# Patient Record
Sex: MALE | Race: WHITE | NOT HISPANIC OR LATINO | Employment: OTHER | URBAN - METROPOLITAN AREA
[De-identification: names, ages, dates, MRNs, and addresses within clinical notes are randomized per-mention and may not be internally consistent; named-entity substitution may affect disease eponyms.]

---

## 2017-01-20 NOTE — PATIENT DISCUSSION
(H25.043) Posterior subcapsular polar age-related cataract, bilateral - Assesment : Examination revealed Posterior Subcapsular Polar Senile Cataract. - Plan : Monitor for changes. Advised patient to call our office with decreased vision or increased symptoms.

## 2017-01-20 NOTE — PATIENT DISCUSSION
(O08.066) Vitreous degeneration, bilateral - Assesment : Examination revealed PVD/Dense vitreous strands OU. No defects 360 degrees OU. - Plan : Monitor for changes. Advised patient to call our office with decreased vision or an increase in flashes and/or floaters.

## 2017-01-20 NOTE — PATIENT DISCUSSION
(H35.362) Drusen (degenerative) of macula, left eye - Assesment : Examination revealed Drusen OS. (+) FHx of ARMD - mother. - Plan : Continue areds vitamins and amsler grid testing. Monitor for changes. Advised patient to call our office with decreased vision or increased symptoms.

## 2017-01-20 NOTE — PATIENT DISCUSSION
(H25.13) Age-related nuclear cataract, bilateral - Assesment : Examination reveals moderate senile nuclear cataract. Patient currently has mild symptoms but functioning well. - Plan : Monitor for changes at this time. May consider cataract surgery if vision is still bothersome after getting new rx glasses. Advised patient to call our office with decreased vision or increased symptoms.  RV 1 year Exam.

## 2017-01-20 NOTE — PATIENT DISCUSSION
(E11.9) Type 2 diabetes mellitus without complications - Assesment : Patient has type II diabetes without complications. - Plan : Discussed the condition and explained to patient that diabetes can cause diabetic retinopathy that can lead to irreversible vision loss. Will continue to observe. Instructed the patient to call if any changes in vision, increase in flashes, or increase in floaters.

## 2017-01-20 NOTE — PATIENT DISCUSSION
(H25.013) Cortical age-related cataract, bilateral - Assesment : Examination revealed Cortical Senile Cataract. - Plan : Monitor for changes. Advised patient to call our office with decreased vision or increased symptoms.

## 2018-02-09 NOTE — PATIENT DISCUSSION
(H25.13) Age-related nuclear cataract, bilateral - Assesment : Examination revealed cataract OU. Mild-moderate symptoms, visual function affected. - Plan : Monitor for changes. Advised patient to call our office with decreased vision or increased symptoms. Updated glasses Rx given, recommend wearing majority of time, especially driving.

## 2018-02-09 NOTE — PATIENT DISCUSSION
(J09.615) Dermatochalasis of left upper eyelid - Assesment : Examination revealed Dermatochalasis BUL. - Plan : Monitor for changes. Advised patient to call our office with decreased vision or increased symptoms.

## 2018-02-09 NOTE — PATIENT DISCUSSION
(W29.604) Dermatochalasis of right upper eyelid - Assesment : Examination revealed Dermatochalasis BUL. - Plan : Monitor for changes. Advised patient to call our office with decreased vision or increased symptoms.

## 2018-02-09 NOTE — PATIENT DISCUSSION
(E11.9) Type 2 diabetes mellitus without complications - Assesment : Examination reveals Type 2 Diabetes mellitus without ocular complications. - Plan : Continue following with PCP for routine care. Stressed importance of keeping A1c levels below 7.0 and monitoring blood sugar. Letter explaining today's findings faxed to patient's PCP. RV in August for Complete Exam and glare check. , or sooner if having problems or changes in vision.

## 2018-08-10 NOTE — PATIENT DISCUSSION
(E11.9) Type 2 diabetes mellitus without complications - Assesment : Examination reveals Type 2 Diabetes mellitus without ocular complications. - Plan : Continue following with PCP for routine care. Stressed importance of keeping A1c levels below 7.0 and monitoring blood sugar. Letter explaining today's findings faxed to patient's PCP. RTC 6 month follow up, or sooner if having problems or changes in vision.

## 2018-08-10 NOTE — PATIENT DISCUSSION
(H25.13) Age-related nuclear cataract, bilateral - Assesment : Examination reveals moderate senile nuclear cataract. Patient currently has mild symptoms but functioning well. - Plan : Monitor for changes at this time. May consider cataract surgery if vision is still bothersome after getting new rx glasses. Advised patient to call our office with decreased vision or increased symptoms.

## 2018-08-10 NOTE — PATIENT DISCUSSION
(Y42.232) Dermatochalasis of left upper eyelid - Assesment : Examination revealed Dermatochalasis BUL. - Plan : Monitor for changes. Advised patient to call our office with decreased vision or increased symptoms.

## 2018-08-10 NOTE — PATIENT DISCUSSION
(G26.474) Vitreous degeneration, left eye - Assesment : Examination revealed a posterior vitreous detachment. - Plan : Handouts given on posterior vitreous detachment and risk factors discussed for retinal detachment development. Advised to call immediately with any changes.

## 2018-08-10 NOTE — PATIENT DISCUSSION
(G06.293) Keratoconjunct sicca, not specified as Sjogren's, bilateral - Assesment : Examination revealed Dry Eye Syndrome OU. - Plan : Monitor for changes. Advised patient to call our office with decreased vision or increased symptoms.

## 2018-08-10 NOTE — PATIENT DISCUSSION
(Q91.648) Dermatochalasis of right upper eyelid - Assesment : Examination revealed Dermatochalasis BUL. - Plan : Monitor for changes. Advised patient to call our office with decreased vision or increased symptoms.

## 2019-02-08 NOTE — PATIENT DISCUSSION
(I12.931) Dermatochalasis of left upper eyelid - Assesment : Examination revealed Dermatochalasis - Plan : Monitor for changes. Advised patient to call our office with decreased vision or increased symptoms.

## 2019-02-08 NOTE — PATIENT DISCUSSION
(W68.087) Dermatochalasis of right upper eyelid - Assesment : Examination revealed Dermatochalasis - Plan : Monitor for changes. Advised patient to call our office with decreased vision or increased symptoms.

## 2019-02-08 NOTE — PATIENT DISCUSSION
(H25.013) Cortical age-related cataract, bilateral - Assesment : Examination revealed Cortical Senile Cataract. - Plan : Risks, Benefits and Alternatives were discussed with patient at length for Cataract Surgery. Visual symptoms are consistent with Cataract findings on examination and current refraction no longer provides satisfactory vision. Patient understands and desires surgery. All questions answered. Risks, Benefits and Alternatives discussed at length for IOL placement. Patient will need to wear glasses for TBD. RTC May ASCAN/dilation  EYE: OD IOL TYPE: TBD POST OPERATIVE TARGET:TBD DR RECOMMENDED PACKAGE:  TBD PT PREFERRED PACKAGE:  TBD OS to follow Patient will call to schedule appt to see surgery counselor.

## 2019-02-08 NOTE — PATIENT DISCUSSION
(E11.9) Type 2 diabetes mellitus without complications - Assesment : Examination reveals Type 2 Diabetes mellitus without ocular complications. Baseline OCT mac today suggests OD WNL, OS possible DR. - Plan : Continue following with PCP for routine care. Stressed importance of keeping A1c levels below 7.0 and monitoring blood sugar. Letter explaining today's findings faxed to patient's PCP.

## 2019-02-18 NOTE — PATIENT DISCUSSION
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 K clear Os. Hx of trauma to OS x 20 years ago. - Plan : Lash epilated at slit lamp with forceps, without incident. Advised patient to call our office with decreased vision or increased symptoms. Keep scheduled appt.

## 2019-02-18 NOTE — PATIENT DISCUSSION
(T15. 12XA) Foreign body in conjunctival sac, left eye, init encntr - Assesment : Examination revealed foreign body. Fiber in conjunctival sac OS. - Plan : Removed with qtip at slit lamp. Monitor for changes.

## 2019-04-22 NOTE — PATIENT DISCUSSION
(H25.875) Posterior subcapsular polar age-related cataract, bilateral - Assesment : Examination revealed Posterior Subcapsular Polar Senile Cataract. - Plan : see plan #1.

## 2019-04-22 NOTE — PATIENT DISCUSSION
(H25.013) Cortical age-related cataract, bilateral - Assesment : Examination revealed Cortical Senile Cataract. Patient is symptomatic with visual function affected. - Plan : Risks, Benefits and Alternatives were discussed with patient at length for Cataract Surgery. Visual symptoms are consistent with Cataract findings on examination and current refraction no longer provides satisfactory vision. Minimal astigmatism OU and does not warrant toric lens. Patient does not mind wearing glasses. Patient understands and desires surgery. All questions answered. Risks, Benefits and Alternatives discussed at length for IOL placement. Patient will need to wear glasses for near and best corrected distance vision. EYE: OD IOL TYPE: Monofocal lens  POST OPERATIVE TARGET: [place]  RECOMMENDED PACKAGE:  None  PT PREFERRED PACKAGE:  None  OS to follow. Patient to see surgery counselor today.

## 2019-05-10 NOTE — PATIENT DISCUSSION
(H25.012) Cortical age-related cataract, left eye - Assesment : Examination revealed Cortical Senile Cataract. Patient is symptomatic with visual function affected. - Plan : Risks, Benefits and Alternatives were discussed with patient at length for Cataract Surgery. Visual symptoms are consistent with Cataract findings on examination and current refraction no longer provides satisfactory vision. Minimal astigmatism OU and does not warrant toric lens. Patient does not mind wearing glasses. Patient understands and desires surgery. All questions answered. Risks, Benefits and Alternatives discussed at length for IOL placement. Patient will need to wear glasses for near and best corrected distance vision. EYE: OS IOL TYPE: Monofocal lens  POST OPERATIVE TARGET: [place]  RECOMMENDED PACKAGE:  None  PT PREFERRED PACKAGE:  None   Patient to see surgery counselor today.

## 2019-05-29 NOTE — PATIENT DISCUSSION
(Z96.1) Presence of intraocular lens - Assesment : Patient is Pseudophakic. - Plan : Discussed signs and symptoms of infection and retinal detachments. Do not rub operated eye. Follow drop schedule If redness,pain,decreased vision, flashes or floaters occur then contact clinic.   RTC 3 weeks post op follow up

## 2019-06-17 NOTE — PATIENT DISCUSSION
(R79.516) Dermatochalasis of right upper eyelid - Assesment : Examination revealed Dermatochalasis - Plan : May consider lid evaluation with JRB once patient recovers from back surgery. Patient will call to schedule. Monitor for changes. Advised patient to call our office with decreased vision or increased symptoms.

## 2019-06-17 NOTE — PATIENT DISCUSSION
(X04.988) Dermatochalasis of left upper eyelid - Assesment : Examination revealed Dermatochalasis - Plan : See plan #2. Monitor for changes. Advised patient to call our office with decreased vision or increased symptoms.

## 2019-08-16 NOTE — PATIENT DISCUSSION
(T08.924) Dermatochalasis of left upper eyelid - Assesment : Examination revealed Dermatochalasis - Plan : TYPE OF SURGERY: BULB          I counseled the patient regarding the following:Blepharoplasty: Bilateral upper blepharoplasty discussed with patient. Visual Fields and Photos were done today to demonstrate the visual impairment related to the visual complaint of the patient. R/B/A to surgery discussed with patient, including, but not limited to: scar, pain, bleeding, infection, loss of vision, double vision, asymmetry, over correction, under correction, poor cosmetic outcome, difficulty closing eyes, dry eye, dependence on lubricating drops postoperatively. Patient understands brow ptosis composes part of upper lid ptosis. Patient understands R/B/A and would like to proceed with surgery in the near future.

## 2019-08-16 NOTE — PATIENT DISCUSSION
(G82.982) Dermatochalasis of right upper eyelid - Assesment : Examination revealed Dermatochalasis. ADVISED PATIENT NOT TO SUBMERGE HEAD UNDER WATER AFTER SURGERY AND DO NOT RUB THE EYES.  EXPECT TO BE RED AND PUFFY AFTER THE SURGERY - Plan : TYPE OF SURGERY: BULB          I counseled the patient regarding the following:Blepharoplasty: Bilateral upper blepharoplasty discussed with patient. Visual Fields and Photos were done today to demonstrate the visual impairment related to the visual complaint of the patient. R/B/A to surgery discussed with patient, including, but not limited to: scar, pain, bleeding, infection, loss of vision, double vision, asymmetry, over correction, under correction, poor cosmetic outcome, difficulty closing eyes, dry eye, dependence on lubricating drops postoperatively. Patient understands brow ptosis composes part of upper lid ptosis. Patient understands R/B/A and would like to proceed with surgery in the near future.

## 2019-09-19 NOTE — PATIENT DISCUSSION
(U78.330) Dermatochalasis of left upper eyelid - Assesment : Status post operative BULB. - Plan : Discussed signs and symptoms of infection, bleeding, dry eyes. Continue Antibiotic Ointment and tears per the post operative instructions. Follow up in one week.

## 2019-09-19 NOTE — PATIENT DISCUSSION
(D30.874) Dermatochalasis of right upper eyelid - Assesment : Status post operative BULB. Avoid submerging head under water. - Plan : Discussed signs and symptoms of infection, bleeding, dry eyes. Continue Antibiotic Ointment and tears per the post operative instructions. Follow up in one week.

## 2019-09-27 NOTE — PATIENT DISCUSSION
(L71.778) Dermatochalasis of right upper eyelid - Assesment : Status post operative BULB. Sutures removed today - Plan : Discussed signs and symptoms of infection, bleeding, dry eyes. Continue Antibiotic Ointment QHS x 4 nights then D/C and tears per the post operative instructions. Stressed importance of not rubbing eyes, can resume wearing makeup in 1 week, sleep goggles given to be worn QHS.   RV Feb Exam/Mac OCT

## 2019-09-27 NOTE — PATIENT DISCUSSION
(D42.098) Dermatochalasis of left upper eyelid - Assesment : Status post operative BULB.  - Plan : See plan # 2

## 2020-12-03 ENCOUNTER — NEW PATIENT (OUTPATIENT)
Dept: URBAN - METROPOLITAN AREA CLINIC 44 | Facility: CLINIC | Age: 65
End: 2020-12-03

## 2020-12-03 DIAGNOSIS — H02.831: ICD-10-CM

## 2020-12-03 DIAGNOSIS — H02.835: ICD-10-CM

## 2020-12-03 DIAGNOSIS — H57.813: ICD-10-CM

## 2020-12-03 DIAGNOSIS — H02.834: ICD-10-CM

## 2020-12-03 DIAGNOSIS — H02.832: ICD-10-CM

## 2020-12-03 PROCEDURE — 99203 OFFICE O/P NEW LOW 30 MIN: CPT

## 2020-12-03 PROCEDURE — 92285 EXTERNAL OCULAR PHOTOGRAPHY: CPT

## 2020-12-03 ASSESSMENT — VISUAL ACUITY
OS_SC: 20/20
OD_SC: 20/20

## 2020-12-07 ENCOUNTER — TECH ONLY (OUTPATIENT)
Dept: URBAN - METROPOLITAN AREA CLINIC 43 | Facility: CLINIC | Age: 65
End: 2020-12-07

## 2020-12-07 DIAGNOSIS — H02.835: ICD-10-CM

## 2020-12-07 DIAGNOSIS — H02.834: ICD-10-CM

## 2020-12-07 DIAGNOSIS — H02.832: ICD-10-CM

## 2020-12-07 DIAGNOSIS — H02.831: ICD-10-CM

## 2020-12-07 DIAGNOSIS — H57.813: ICD-10-CM

## 2020-12-07 PROCEDURE — 92082 INTERMEDIATE VISUAL FIELD XM: CPT

## 2020-12-07 PROCEDURE — 99211T TECH SERVICE

## 2021-01-07 ENCOUNTER — PRE-OP/H&P (OUTPATIENT)
Dept: URBAN - METROPOLITAN AREA CLINIC 44 | Facility: CLINIC | Age: 66
End: 2021-01-07

## 2021-01-07 DIAGNOSIS — H02.835: ICD-10-CM

## 2021-01-07 DIAGNOSIS — H02.832: ICD-10-CM

## 2021-01-07 DIAGNOSIS — H02.834: ICD-10-CM

## 2021-01-07 DIAGNOSIS — H02.831: ICD-10-CM

## 2021-01-07 DIAGNOSIS — H57.813: ICD-10-CM

## 2021-01-07 PROCEDURE — 99211HP H&P OFFICE/OUTPATIENT VISIT, EST

## 2021-01-07 RX ORDER — TOBRAMYCIN AND DEXAMETHASONE 1; 3 MG/ML; MG/ML
1 SUSPENSION/ DROPS OPHTHALMIC
Start: 2021-01-25

## 2021-01-07 RX ORDER — ERYTHROMYCIN 5 MG/G
OINTMENT OPHTHALMIC
Start: 2021-01-25

## 2021-02-02 ENCOUNTER — PRE-OP/H&P (OUTPATIENT)
Dept: URBAN - METROPOLITAN AREA SURGERY 14 | Facility: SURGERY | Age: 66
End: 2021-02-02

## 2021-02-02 ENCOUNTER — SURGERY/PROCEDURE (OUTPATIENT)
Dept: URBAN - METROPOLITAN AREA SURGERY 14 | Facility: SURGERY | Age: 66
End: 2021-02-02

## 2021-02-02 DIAGNOSIS — H02.831: ICD-10-CM

## 2021-02-02 DIAGNOSIS — H02.835: ICD-10-CM

## 2021-02-02 DIAGNOSIS — H02.834: ICD-10-CM

## 2021-02-02 DIAGNOSIS — Z41.1: ICD-10-CM

## 2021-02-02 DIAGNOSIS — H57.813: ICD-10-CM

## 2021-02-02 DIAGNOSIS — H02.832: ICD-10-CM

## 2021-02-02 PROCEDURE — 15821 BLEPHARP LWR EYELID FAT PAD: CPT

## 2021-02-02 PROCEDURE — 99499 UNLISTED E&M SERVICE: CPT

## 2021-02-02 PROCEDURE — 15823F PER EYE FAT REMOVAL BILL W/ 15823

## 2021-02-02 PROCEDURE — 1582350 UPPER BLEPH PER EYE FUNCTIONAL-BILATERAL

## 2021-02-03 ENCOUNTER — POST-OP (OUTPATIENT)
Dept: URBAN - METROPOLITAN AREA CLINIC 39 | Facility: CLINIC | Age: 66
End: 2021-02-03

## 2021-02-03 DIAGNOSIS — Z98.890: ICD-10-CM

## 2021-02-03 PROCEDURE — 99024 POSTOP FOLLOW-UP VISIT: CPT

## 2021-02-03 ASSESSMENT — VISUAL ACUITY
OS_SC: 20/50
OD_SC: 20/60

## 2021-02-09 ENCOUNTER — POST-OP (OUTPATIENT)
Dept: URBAN - METROPOLITAN AREA CLINIC 39 | Facility: CLINIC | Age: 66
End: 2021-02-09

## 2021-02-09 DIAGNOSIS — Z98.890: ICD-10-CM

## 2021-02-09 PROCEDURE — 99024 POSTOP FOLLOW-UP VISIT: CPT

## 2021-02-09 ASSESSMENT — VISUAL ACUITY
OD_SC: 20/40
OS_SC: 20/40

## 2021-03-11 ENCOUNTER — POST-OP (OUTPATIENT)
Dept: URBAN - METROPOLITAN AREA CLINIC 44 | Facility: CLINIC | Age: 66
End: 2021-03-11

## 2021-03-11 DIAGNOSIS — Z98.890: ICD-10-CM

## 2021-03-11 PROCEDURE — 99024 POSTOP FOLLOW-UP VISIT: CPT

## 2021-03-11 ASSESSMENT — VISUAL ACUITY
OD_SC: 20/40
OS_SC: 20/40

## 2024-01-18 ENCOUNTER — APPOINTMENT (RX ONLY)
Dept: URBAN - METROPOLITAN AREA CLINIC 120 | Facility: CLINIC | Age: 69
Setting detail: DERMATOLOGY
End: 2024-01-18

## 2024-01-18 DIAGNOSIS — D18.0 HEMANGIOMA: ICD-10-CM

## 2024-01-18 DIAGNOSIS — L82.1 OTHER SEBORRHEIC KERATOSIS: ICD-10-CM

## 2024-01-18 DIAGNOSIS — L90.5 SCAR CONDITIONS AND FIBROSIS OF SKIN: ICD-10-CM

## 2024-01-18 DIAGNOSIS — L81.5 LEUKODERMA, NOT ELSEWHERE CLASSIFIED: ICD-10-CM

## 2024-01-18 DIAGNOSIS — L81.4 OTHER MELANIN HYPERPIGMENTATION: ICD-10-CM

## 2024-01-18 PROBLEM — D18.01 HEMANGIOMA OF SKIN AND SUBCUTANEOUS TISSUE: Status: ACTIVE | Noted: 2024-01-18

## 2024-01-18 PROCEDURE — ? COUNSELING

## 2024-01-18 PROCEDURE — 99202 OFFICE O/P NEW SF 15 MIN: CPT

## 2024-01-18 ASSESSMENT — LOCATION SIMPLE DESCRIPTION DERM
LOCATION SIMPLE: RIGHT UPPER BACK
LOCATION SIMPLE: SCALP
LOCATION SIMPLE: RIGHT ANTERIOR NECK
LOCATION SIMPLE: LEFT FOREHEAD
LOCATION SIMPLE: ABDOMEN
LOCATION SIMPLE: CHEST
LOCATION SIMPLE: LEFT LOWER BACK
LOCATION SIMPLE: LEFT FOREARM
LOCATION SIMPLE: RIGHT FOREHEAD
LOCATION SIMPLE: LEFT SCALP
LOCATION SIMPLE: RIGHT FOREARM
LOCATION SIMPLE: UPPER BACK
LOCATION SIMPLE: RIGHT SHOULDER
LOCATION SIMPLE: LEFT UPPER BACK

## 2024-01-18 ASSESSMENT — LOCATION ZONE DERM
LOCATION ZONE: TRUNK
LOCATION ZONE: ARM
LOCATION ZONE: FACE
LOCATION ZONE: SCALP
LOCATION ZONE: NECK

## 2024-01-18 ASSESSMENT — LOCATION DETAILED DESCRIPTION DERM
LOCATION DETAILED: LEFT SUPERIOR LATERAL MIDBACK
LOCATION DETAILED: SUPERIOR THORACIC SPINE
LOCATION DETAILED: RIGHT FOREHEAD
LOCATION DETAILED: RIGHT POSTERIOR SHOULDER
LOCATION DETAILED: RIGHT LATERAL ABDOMEN
LOCATION DETAILED: EPIGASTRIC SKIN
LOCATION DETAILED: LEFT CENTRAL PARIETAL SCALP
LOCATION DETAILED: LEFT FOREHEAD
LOCATION DETAILED: LEFT PROXIMAL DORSAL FOREARM
LOCATION DETAILED: LEFT MEDIAL FRONTAL SCALP
LOCATION DETAILED: LEFT MID-UPPER BACK
LOCATION DETAILED: RIGHT INFERIOR UPPER BACK
LOCATION DETAILED: LEFT MEDIAL SUPERIOR CHEST
LOCATION DETAILED: RIGHT RIB CAGE
LOCATION DETAILED: RIGHT CLAVICULAR NECK
LOCATION DETAILED: LEFT INFERIOR MEDIAL UPPER BACK
LOCATION DETAILED: LEFT SUPERIOR POSTAURICULAR SKIN
LOCATION DETAILED: RIGHT PROXIMAL DORSAL FOREARM